# Patient Record
Sex: FEMALE | URBAN - METROPOLITAN AREA
[De-identification: names, ages, dates, MRNs, and addresses within clinical notes are randomized per-mention and may not be internally consistent; named-entity substitution may affect disease eponyms.]

---

## 2024-08-27 ENCOUNTER — APPOINTMENT (RX ONLY)
Dept: URBAN - METROPOLITAN AREA CLINIC 125 | Facility: CLINIC | Age: 44
Setting detail: DERMATOLOGY
End: 2024-08-27

## 2024-08-27 DIAGNOSIS — L30.9 DERMATITIS, UNSPECIFIED: ICD-10-CM | Status: INADEQUATELY CONTROLLED

## 2024-08-27 PROCEDURE — ? COUNSELING

## 2024-08-27 PROCEDURE — ? CHRONOLOGY OF PRESENT ILLNESS

## 2024-08-27 PROCEDURE — 99204 OFFICE O/P NEW MOD 45 MIN: CPT

## 2024-08-27 PROCEDURE — ? DISCUSSION OF MANAGEMENT WITH EXTERNAL PROVIDER

## 2024-08-27 ASSESSMENT — LOCATION SIMPLE DESCRIPTION DERM
LOCATION SIMPLE: RIGHT UPPER ARM
LOCATION SIMPLE: ABDOMEN
LOCATION SIMPLE: LEFT UPPER ARM
LOCATION SIMPLE: LEFT CLAVICULAR SKIN

## 2024-08-27 ASSESSMENT — BSA RASH: BSA RASH: 1

## 2024-08-27 ASSESSMENT — LOCATION DETAILED DESCRIPTION DERM
LOCATION DETAILED: LEFT ANTERIOR DISTAL UPPER ARM
LOCATION DETAILED: LEFT CLAVICULAR SKIN
LOCATION DETAILED: RIGHT ANTECUBITAL SKIN
LOCATION DETAILED: PERIUMBILICAL SKIN

## 2024-08-27 ASSESSMENT — SEVERITY ASSESSMENT: SEVERITY: MILD

## 2024-08-27 ASSESSMENT — ITCH NUMERIC RATING SCALE: HOW SEVERE IS YOUR ITCHING?: 1

## 2024-08-27 ASSESSMENT — LOCATION ZONE DERM
LOCATION ZONE: TRUNK
LOCATION ZONE: ARM

## 2024-08-27 NOTE — HPI: RASH (ALLERGIC CONTACT DERMATITIS)
How Severe Is Your Rash?: moderate
Is This A New Presentation, Or A Follow-Up?: Evaluation for Possible Contact Allergy
Additional History: Pt reports that swelling began 4x years ago all over the body, she eventually noticed rashes on the face beginning x3 months ago then spread all over body. Her PCP referred her to Allergist, Rhuematologist, & Dermatologist for additional testing.

## 2024-08-27 NOTE — HPI: RASH (ALLERGIC CONTACT DERMATITIS)
How Severe Is Your Rash?: moderate
Is This A New Presentation, Or A Follow-Up?: Evaluation for Possible Contact Allergy
Additional History: Pt states that she started having swelling all over her body x4 years then x3 months ago she started noticing rashes on her face and all over body. She says that her PCP referred her to Allergist for allergy and Dermatologist for patch testing..

## 2024-08-27 NOTE — PROCEDURE: CHRONOLOGY OF PRESENT ILLNESS
Detail Level: Zone
Chronology Of Present Illness: 8/27/24: \\nPatient reports localized swelling intermittently of the foot/feet, leg/s, elbow, knuckles, palm, neck.  Also reports itchy rash lasting days triggered by sun exposure and ameliorated with oral antihistamine.  No rash today.  She reports localized swelling but I do not appreciate it.  She reports she was extensively workup up in [place] and more recently has seen Dr. Patsy Schreiber in rheumatology at [facility] and was told there was not evidence of autoimmunity.  She reports [facility]. \\n\\nA/P\\nLocalized swelling- not evident today- ?angioedema, arthritis, other.  Defer evaluation unless clearly evident.\\n\\nRash involving cheeks and shoulders, triggered by sun, lasting from days, antihistamines help- not present today.  \\n-?PMLE, ACLE (rheum eval negative per patient), Solar urticaria(lasts for days- not c/w this dx), other.\\n-Continue antihistamines prn\\n-I encouraged sun exposure to try to elicit the rash and she will email us so we can work her in and evaluate and likely biopsy.\\n-history not c/w ACD, no evident role for patch testing at this time\\n-I contacted Rheum to discuss: await call back.